# Patient Record
Sex: FEMALE | Race: WHITE | NOT HISPANIC OR LATINO | URBAN - METROPOLITAN AREA
[De-identification: names, ages, dates, MRNs, and addresses within clinical notes are randomized per-mention and may not be internally consistent; named-entity substitution may affect disease eponyms.]

---

## 2019-06-16 ENCOUNTER — EMERGENCY (EMERGENCY)
Facility: HOSPITAL | Age: 64
LOS: 0 days | Discharge: HOME | End: 2019-06-16
Attending: EMERGENCY MEDICINE | Admitting: EMERGENCY MEDICINE
Payer: COMMERCIAL

## 2019-06-16 VITALS
OXYGEN SATURATION: 100 % | HEART RATE: 87 BPM | TEMPERATURE: 98 F | DIASTOLIC BLOOD PRESSURE: 85 MMHG | RESPIRATION RATE: 17 BRPM | SYSTOLIC BLOOD PRESSURE: 151 MMHG

## 2019-06-16 VITALS — WEIGHT: 125 LBS | HEIGHT: 64 IN
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
## 2019-06-16 DIAGNOSIS — S92.511A DISPLACED FRACTURE OF PROXIMAL PHALANX OF RIGHT LESSER TOE(S), INITIAL ENCOUNTER FOR CLOSED FRACTURE: ICD-10-CM

## 2019-06-16 PROCEDURE — 73660 X-RAY EXAM OF TOE(S): CPT | Mod: 26,RT

## 2019-06-16 PROCEDURE — 99283 EMERGENCY DEPT VISIT LOW MDM: CPT

## 2019-06-16 RX ORDER — IBUPROFEN 200 MG
400 TABLET ORAL ONCE
Refills: 0 | Status: COMPLETED | OUTPATIENT
Start: 2019-06-16 | End: 2019-06-16

## 2019-06-16 RX ADMIN — Medication 400 MILLIGRAM(S): at 21:01

## 2019-06-16 NOTE — ED STATDOCS - ATTENDING CONTRIBUTION TO CARE
Attending Contribution to Care: I, Trish Pollard, performed the initial face to face bedside interview with this patient regarding history of present illness, review of symptoms and relevant past medical, social and family history.  I completed an independent physical examination.  I was the initial provider who evaluated this patient. I have signed out the follow up of any pending tests (i.e. labs, radiological studies) to the ACP.  I have communicated the patient’s plan of care and disposition with the ACP.

## 2019-06-16 NOTE — ED STATDOCS - CLINICAL SUMMARY MEDICAL DECISION MAKING FREE TEXT BOX
Patient presents with toe pain after injury.  +fracture.  PLaced in montez tape.  She will follow up outpatient -John Madrid PA-C

## 2019-06-16 NOTE — ED STATDOCS - PROGRESS NOTE DETAILS
Patient seen and evaluated.  +toe fracture.  Placed in montez tape and reviewed care, follow up and return precautions -John Madrid PA-C

## 2019-06-16 NOTE — ED STATDOCS - OBJECTIVE STATEMENT
62 y/o female with no PMHx presents to the ED c/o sudden onset R 4th toe pain with assoc. mild deformity s/p injury. Pt states that she stubbed toe on wooden part of couch. No other acute complaints.

## 2020-07-08 NOTE — ED STATDOCS - NS ED ATTENDING STATEMENT MOD
Patient: Bing Zee Date of Service: 2020     : 1940 Attending: Michelle Diggs MD   80 year old female      HYPERBARIC OXYGEN THERAPY PROCEDURE NOTE    Hyperbaric Treatment Number: 33 - Hyperbaric treatment scheduled once a day.   Hyperbaric Indications: Other specified disorders of the skin and subcutaneous tissue related to radiation,  L59.8   Primary Hyperbaric Physician: Dr. Wayne Palomino   Consult/Update Date: 20        PROCEDURE:  Pre-Hyperbaric Oxygen Therapy Treatment timeout was completed.    Treatment Profile: 45 fsw X 90 min  Treatment Start Time: 1215  Treatment End Time: 1415  Descent Time (min): 10 min  Air break total time: 10 min  Ascent time (min): 10    Hyperbaric oxygen therapy was monitored during entire course of treatment by the supervising physician.    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     Bing Zee is a 80 year old female who originally presented to Dr. Edmonds on 19 for post menopausal bleeding in the setting of a hysterectomy in  for cervical dysplasia.       Pathology reviewed from vaginal biopsies showed poorly differentiated squamous cell carcinoma. Unclear if new primary or a recurrence/progression of the cervical high-grade dysplasia seen with hysterectomy in . 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 This was done with concurrent cisplatin chemotherapy. The vaginal disease was then boosted with -700 cGy brachytherapy ×3 procedures, last one was 06/19/2019.  Appears to be a total of 8469-3972 cGy of pelvic radiation after review with [facility] 05/07/20 via phone.     Over the past 1-2 months prior to consult, patient had concerns of worsening vulvar and vaginal pain.  She was seen by gynonc April 2020 at which time it was noted that there was significant edema in the left labia and vulva.  She was started on Keflex and Flagyl and stated that her symptoms did improve.      Patient called the clinic again 5/2020 complaining of worsening vulvar pain, thus was concerned and was admitted for pain control and exam under anesthesia.     Patient is now post-op 05/07/20 for exam under anesthesia, Vulvar and vaginal biopsies.  Post-op diagnosis was necrotic vulva and external vagina, secondary to radiation changes, with associated edema.     Hyperbaric oxygen therapy was considered at that time, by patient and gyn onc team, and patient was available.     -Patient has a history of chronic sinusitis, states she generally does not take any medications for this.  -Patient has a history of combined systolic/diastolic CHF, last EF was 50% on 05/05/20.  -Patient has a R chest port still inserted  -Yes history of malignancies (cervial ca treated with CHUCK/BSO, then vaginal ca treated with chemo/rads, also renal clear cell ca treated with cryoablation).  Yes history of cisplatin chemotherapy 4/17/19 through 5/21/19.  Not currently taking bleomycin, cisplatin, disulfiram, doxorubicin, or sulfamylon.  -Patient does have a history of afib on anticoagulation and CAD  Patient denies having a fever or URI currently.    Patient denies ear problems.    Patient denies history of  recent eye surgery, retinal surgery, cataracts, optic neuritis, or congenital spherocytosis.    Patient denies pneumothorax or history of thoracic surgery within the last 6 months.   Patient denies asthma or COPD, she was a former smoker however.    Patient denies having a pacemaker or other implantable device apart from above.    Denies history of seizure disorder.    Denies confinement anxiety.    Denies history of diabetes    Patient is now s/p the following on 06/08/20:  \"PREOPERATIVE DIAGNOSES:  1) History of vaginal carcinoma, s/p chemo potentiated radiation  2) Previous concern for vulvar cellulitis, treated with antibiotics  3) Acute intense vulvar and vaginal pain, unresponsive to therapies and requiring admission  4) Inability to tolerate exam in office secondary to intense pain  5) Now S/p 19 treatments of Hyperbaric oxygen as inpatient.   POSTOPERATIVE DIAGNOSIS:  Extensive necrosis throughout bilateral vulva  PROCEDURE:  Exam under anesthesia, extensive debridement of vulvar wound\"    Pain is much better controlled.    The patient reports today no complaints, no chest pain, no shortness of breath, no ear pain, no decreased hearing, no ear fullness, no abdominal pain, no congestion, no nausea, no vomiting, no fever and no headache.  Has been taking flonase on the floor (hx chronic sinusitis).    Patient notes 6/29/20 that she has been having some intermittent blurry visit when watching TV, denies pain/objects/lights/floaters, these episodes last 15min or so then seem to resolve, no headache noted.    Aborted 6/30/20 dive early secondary to \"shortness of breath\" while at depth, organic etiology could not be elicited, confinement anxiety high on differential.  Tried benzo before dive 07/02/20 and worked well.    Risk Assessment at Consult:    Risk assessment was performed at time of consult/comprehensive evaluation on 05/07/20. Specifically noted risks include:    History of congestive heart failure or  pulmonary edema  History of disease of cardiovascular system and edema (lung)  Diabetic on glucose lowering medications  Risk for barotrauma     Pertinent Reviewed:    Allergies, Surgical History and Medical History    PHYSICAL EXAM:  Daily Risk Assessment:  Finger Stick Glucose:      No results available in last 24 hours     Vital signs:    Temp: 98.1 °F (36.7 °C)  Temp src: Oral  Heart Rate: 70  Heart Rate Source: Monitor  BP: (!) 85/53  MAP (mmHg): (!) 69  Resp: 16    General appearance:  alert, cooperative, no distress  Ears:   able to autoinflate ears without difficulty.  Lungs:  clear to auscultation bilaterally  Heart:  irregularly irregular rhythm    Wound exams are being completed by Gynonc due to pain and possibly need for ongoing debridements, see their procedure/op notes.    POST TREATMENT:    Patient denies complaints    Patient tolerated the hyperbaric treatment without problems or side effects    Treatment extras: None    Post-treatment Exam:  No change in appearance or examination from pre-treatment exam other than stated above.      ASSESSMENT:    MEDICAL DECISION MAKING:  Based on recent clinical evaluation, the patient is improving.    Indications of this are:  Improvement of tissue ischemia/hypoxia.    Improved tissue viability.   Improvement in pain control.     Her pain control is better and she is even able to sit longer without pain    Therefore, we will continue treatment as the patient will likely benefit from further hyperbaric oxygen therapy.     PLAN:  This is hyperbaric Treatment Number: 33 of anticipated 40-60 treatments.    -TEED gr 2 bilaterally after first dive, patient states she did have ear pain during dive but did not make us aware, continue flonase with hx chronic sinusitis, reinforced to patient to communicate with us whenever having the slighest ear pain during HBOT, this had since improved, TEED gr 1 noted intermittently but then resolves.    -Patient has a history of  combined systolic/diastolic CHF, last EF was 50% on 05/05/20, sitting patient as upright as possible during HBOT    -Patient does have a history of afib on anticoagulation and CAD, at times with short bursts of afib with RVR.  Cards curbsided 05/11/20, stated patient ok for HBOT from their perspective.  S/p cardiac monitor.    -Patient to have HBOT at [facility] while inpatient, then HBOT at [facility] as outpatient (as she lives closer to that facility).  Auth has been submitted.  Patient had no transportation however, and no facilities would arrange transport, thus appears to be staying inpatient at [facility] to have ongoing HBOT.    -Weekend HBOT at [facility] was attempted to be coordinated to shorten hospital stay, but this was deemed as not cost effective.  After discussion with Dr. Rooney (medical director), BiD treatments are an approved option to shorten hospital stay even for non-emergent indications, patient is agreeable to have BiD treatments on M, Tu, Thurs.    -Path from 5/7/20 OR showing no elsy malignancy, appears radiation-related tissue changes and necrosis.  One small \"upper vagina\" area with mild atypia, favored to be reactive due to chemotherapy potentiated radiation, however, low grade dysplasia cannot be entirely excluded, no high grade dysplasia or malignancy is identified.      -SOB in last 5 min of HBOT noted 06/25/20 (patient said nothing about this until she was out of the chamber), exam was normal, CXR with no acute change.  SOB noted while at depth 06/30/20, aborted dive early.  Confinement anxiety at this point is very high on the differential, less likely oxygen toxicity or pulmonary edema.  See associated notes from 6/30/20.  Tried benzo before dive 07/02/20 and worked well, will continue.    -\"intermittent blurry vision while watching TV\" noted 06/29/20, unclear if hyperbaric related myopia as this is generally persistent and progressive, denies pain/objects/lights/floaters, these episodes last 15min  or so then seem to resolve, no headache noted.  Will monitor.    7/7/20:  Received Norco upstairs before treatment and had no difficulty today.  Does not appear to have taken ordered 0.5 mg Ativan for confinement anxiety, but may benefit from restarting tomorrow.  HBO2 tech to discuss with nursing.    7/8 did well without new medication     Plan discussed.  Patient stable at time of discharge.  All questions were answered prior to discharge.     Bronson Ramirez M.D.  Infectious Diseases  Wound Care  Hyperbaric Medicine      Significant note data copied forward from Dr. Wayne Palomino and reviewed by me as needed in the formulation of this note and plan.     I have personally performed a face to face diagnostic evaluation on this patient. I have reviewed the ACP note and agree with the history, exam and plan of care, except as noted.

## 2022-03-03 NOTE — ED STATDOCS - MUSCULOSKELETAL, MLM
4 Eyes Skin Assessment Completed by JULY Berrios and JULY Watson.    Head WDL  Ears WDL  Nose WDL  Mouth WDL  Neck WDL  Breast/Chest WDL  Shoulder Blades WDL  Spine WDL  (R) Arm/Elbow/Hand WDL  (L) Arm/Elbow/Hand WDL  Abdomen WDL  Groin WDL  Scrotum/Coccyx/Buttocks WDL  (R) Leg WDL  (L) Leg WDL  (R) Heel/Foot/Toe WDL  (L) Heel/Foot/Toe WDL          Devices In Places Tele Box      Interventions In Place N/A    Possible Skin Injury No    Pictures Uploaded Into Epic N/A  Wound Consult Placed N/A  RN Wound Prevention Protocol Ordered No    4th toe with TTP. lateral angulation of 4th toe of R foot.

## 2022-08-31 NOTE — ED PROCEDURE NOTE - NS ED PROC PERFORMED BY1 FT
History and physical documented and up to date, allergies reviewed, lab results reviewed, pre-procedure education provided, patient verbalized understanding of procedure, procedural consent signed and patient is NPO. -John Madrid PA-C

## 2023-02-24 NOTE — ED PROCEDURE NOTE - CPROC ED TOLERANCE1
Report received from [facility] and care assumed at this time.      Debra Sanders RN  02/24/23 0781 Patient tolerated procedure well.
